# Patient Record
Sex: MALE | Race: BLACK OR AFRICAN AMERICAN | NOT HISPANIC OR LATINO | Employment: UNEMPLOYED | URBAN - METROPOLITAN AREA
[De-identification: names, ages, dates, MRNs, and addresses within clinical notes are randomized per-mention and may not be internally consistent; named-entity substitution may affect disease eponyms.]

---

## 2018-07-03 ENCOUNTER — HOSPITAL ENCOUNTER (EMERGENCY)
Facility: CLINIC | Age: 23
Discharge: HOME | End: 2018-07-03
Attending: EMERGENCY MEDICINE | Admitting: EMERGENCY MEDICINE
Payer: COMMERCIAL

## 2018-07-03 VITALS
WEIGHT: 200 LBS | TEMPERATURE: 98.3 F | SYSTOLIC BLOOD PRESSURE: 133 MMHG | DIASTOLIC BLOOD PRESSURE: 81 MMHG | RESPIRATION RATE: 18 BRPM | OXYGEN SATURATION: 99 %

## 2018-07-03 DIAGNOSIS — S43.003A: ICD-10-CM

## 2018-07-03 DIAGNOSIS — E10.649 HYPOGLYCEMIA DUE TO TYPE 1 DIABETES MELLITUS (H): ICD-10-CM

## 2018-07-03 DIAGNOSIS — R56.9 GENERALIZED CONVULSIVE SEIZURE (H): ICD-10-CM

## 2018-07-03 LAB
GLUCOSE BLDC GLUCOMTR-MCNC: 116 MG/DL (ref 70–99)
GLUCOSE BLDC GLUCOMTR-MCNC: 130 MG/DL (ref 70–99)
GLUCOSE BLDC GLUCOMTR-MCNC: 139 MG/DL (ref 70–99)
GLUCOSE BLDC GLUCOMTR-MCNC: 70 MG/DL (ref 70–99)

## 2018-07-03 PROCEDURE — 25000132 ZZH RX MED GY IP 250 OP 250 PS 637: Performed by: EMERGENCY MEDICINE

## 2018-07-03 PROCEDURE — 96361 HYDRATE IV INFUSION ADD-ON: CPT

## 2018-07-03 PROCEDURE — 96374 THER/PROPH/DIAG INJ IV PUSH: CPT

## 2018-07-03 PROCEDURE — 25000128 H RX IP 250 OP 636: Performed by: EMERGENCY MEDICINE

## 2018-07-03 PROCEDURE — 99284 EMERGENCY DEPT VISIT MOD MDM: CPT | Mod: 25

## 2018-07-03 PROCEDURE — 00000146 ZZHCL STATISTIC GLUCOSE BY METER IP

## 2018-07-03 PROCEDURE — 80177 DRUG SCRN QUAN LEVETIRACETAM: CPT | Performed by: EMERGENCY MEDICINE

## 2018-07-03 RX ORDER — KETOROLAC TROMETHAMINE 15 MG/ML
15 INJECTION, SOLUTION INTRAMUSCULAR; INTRAVENOUS ONCE
Status: COMPLETED | OUTPATIENT
Start: 2018-07-03 | End: 2018-07-03

## 2018-07-03 RX ORDER — LEVETIRACETAM 500 MG/1
500 TABLET ORAL ONCE
Status: COMPLETED | OUTPATIENT
Start: 2018-07-03 | End: 2018-07-03

## 2018-07-03 RX ADMIN — SODIUM CHLORIDE, POTASSIUM CHLORIDE, SODIUM LACTATE AND CALCIUM CHLORIDE 500 ML: 600; 310; 30; 20 INJECTION, SOLUTION INTRAVENOUS at 16:58

## 2018-07-03 RX ADMIN — LEVETIRACETAM 500 MG: 500 TABLET, FILM COATED ORAL at 18:16

## 2018-07-03 RX ADMIN — KETOROLAC TROMETHAMINE 15 MG: 15 INJECTION, SOLUTION INTRAMUSCULAR; INTRAVENOUS at 16:58

## 2018-07-03 ASSESSMENT — ENCOUNTER SYMPTOMS: SEIZURES: 1

## 2018-07-03 NOTE — ED AVS SNAPSHOT
Emergency Department    [address] [place] [address]    Phone:  [phone]    Fax:  [phone]                                       Dante Ivey   MRN: [id number]    Department:   Emergency Department   Date of Visit:  7/3/2018           Patient Information     Date Of Birth          1995        Your diagnoses for this visit were:     Generalized convulsive seizure (H) Likely secondary to hypoglycemia    Hypoglycemia due to type 1 diabetes mellitus (H)     Shoulder subluxation, unspecified laterality, initial encounter Bilat during the seizure       You were seen by Judy Huber MD.      Follow-up Information     Schedule an appointment as soon as possible for a visit with No Ref-Primary, Physician.        Discharge Instructions       Eat a big snack before you go to bed tonight and wake yourself up every 2-3 hours to check your blood sugar.  Adjust your insulin as appropriate and go back to your normal management plan and diet tomorrow.  You cannot drive until you are cleared by your neurologist.  Return to the ER if you have further seizures.  Make sure you carry a form of sugar with you at all times and avoid hypoglycemia.  Tylenol as needed or ibuprofen for your shoulder pain.  Recheck in the clinic with your doctor or an orthopedic doctor if your shoulders continue to bother you over the next week or 2.  Continue your Keppra and take your normal dose tonight.        Discharge References/Attachments     BLOOD SUGAR, LOW; HYPOGLYCEMIA (ENGLISH)      24 Hour Appointment Hotline       To make an appointment at any [facility], call [phone] ([phone]). If you don't have a family doctor or clinic, we will help you find one. [facility] are conveniently located to serve the needs of you and your family.             Review of your medicines      Notice     You have not been prescribed any medications.            Procedures and tests performed during your  visit     Procedure/Test Number of Times Performed    Glucose by meter 4    Keppra (Levetiracetam) Level 1      Orders Needing Specimen Collection     None      Pending Results     Date and Time Order Name Status Description    7/3/2018 1707 Keppra (Levetiracetam) Level In process             Pending Culture Results     No orders found from 7/1/2018 to 7/4/2018.            Pending Results Instructions     If you had any lab results that were not finalized at the time of your Discharge, you can call the ED Lab Result RN at [phone]. You will be contacted by this team for any positive Lab results or changes in treatment. The nurses are available 7 days a week from 10A to 6:30P.  You can leave a message 24 hours per day and they will return your call.        Test Results From Your Hospital Stay        7/3/2018  5:05 PM      Component Results     Component Value Ref Range & Units Status    Glucose 116 (H) 70 - 99 mg/dL Final         7/3/2018  5:05 PM      Component Results     Component Value Ref Range & Units Status    Glucose 130 (H) 70 - 99 mg/dL Final         7/3/2018  6:05 PM         7/3/2018  6:04 PM      Component Results     Component Value Ref Range & Units Status    Glucose 70 70 - 99 mg/dL Final         7/3/2018  7:26 PM      Component Results     Component Value Ref Range & Units Status    Glucose 139 (H) 70 - 99 mg/dL Final                Clinical Quality Measure: Blood Pressure Screening     Your blood pressure was checked while you were in the emergency department today. The last reading we obtained was  BP: 124/82 . Please read the guidelines below about what these numbers mean and what you should do about them.  If your systolic blood pressure (the top number) is less than 120 and your diastolic blood pressure (the bottom number) is less than 80, then your blood pressure is normal. There is nothing more that you need to do about it.  If your systolic blood pressure (the top number) is 120-139 or  "your diastolic blood pressure (the bottom number) is 80-89, your blood pressure may be higher than it should be. You should have your blood pressure rechecked within a year by a primary care provider.  If your systolic blood pressure (the top number) is 140 or greater or your diastolic blood pressure (the bottom number) is 90 or greater, you may have high blood pressure. High blood pressure is treatable, but if left untreated over time it can put you at risk for heart attack, stroke, or kidney failure. You should have your blood pressure rechecked by a primary care provider within the next 4 weeks.  If your provider in the emergency department today gave you specific instructions to follow-up with your doctor or provider even sooner than that, you should follow that instruction and not wait for up to 4 weeks for your follow-up visit.        Thank you for choosing [facility]       Thank you for choosing [facility] for your care. Our goal is always to provide you with excellent care. Hearing back from our patients is one way we can continue to improve our services. Please take a few minutes to complete the written survey that you may receive in the mail after you visit with us. Thank you!        [employer] Information     [employer] lets you send messages to your doctor, view your test results, renew your prescriptions, schedule appointments and more. To sign up, go to [email or web address] . Click on \"Log in\" on the left side of the screen, which will take you to the Welcome page. Then click on \"Sign up Now\" on the right side of the page.     You will be asked to enter the access code listed below, as well as some personal information. Please follow the directions to create your username and password.     Your access code is: [email or web address]  Expires: 10/1/2018  8:07 PM     Your access code will  in 90 days. If you need help or a new code, please call your [facility] clinic or [phone].        Care EveryWhere ID     " This is your Care EveryWhere ID. This could be used by other organizations to access your [place] medical records  [id number]        Equal Access to Services     SHELBIE DEL CID : Michael Galo, hannah collins, lara winn, tariq velez. So [facility] [phone].    ATENCIÓN: Si habla español, tiene a conn disposición servicios gratuitos de asistencia lingüística. Llame al [phone].    We comply with applicable federal civil rights laws and [place] laws. We do not discriminate on the basis of race, color, national origin, age, disability, sex, sexual orientation, or gender identity.            After Visit Summary       This is your record. Keep this with you and show to your community pharmacist(s) and doctor(s) at your next visit.

## 2018-07-03 NOTE — ED PROVIDER NOTES
"  History   Chief Complaint:  Hypoglycemia     HPI   Dante Ivey is a 23 year old male with a history of diabetes who presents via EMS with hypoglycemia. EMS reports that the patient took insulin before eating around 1330 when he began feeling poorly. The patient notes he took too much, and actually had a seizure witnessed by his girlfriend prompting her to call EMS. EMS administered two to three amps of D-50, and states that he ate fries and a sandwich. The patient's girlfriend later adds that in reality the patient only ate a couple fries. The patient's sugar varied throughout the time with EMS, but leveled off in the 150's. His blood sugar at its lowest wsa 58 as measured by EMS. The patient notes he feels better in the ED, but is still \"out of it.\" He has no history of kidney disease, and had one other seizure in the past.    Allergies:  No known drug allergies    Medications:    Keppra   Insulin    Past Medical History:    Type I diabetes  Epilepsy    Past Surgical History:    History reviewed. No pertinent surgical history.    Family History:    History reviewed. No pertinent family history.     Social History:  Smoking status: Never smoker  Alcohol use: No  Marital Status:  Single    Review of Systems   Neurological: Positive for seizures.   All other systems reviewed and are negative.    Physical Exam   Patient Vitals for the past 24 hrs:   BP Temp Temp src Heart Rate Resp SpO2 Weight   07/03/18 1616 124/82 98.3  F (36.8  C) Oral 94 18 99 % 90.7 kg (200 lb)     Physical Exam  Nursing note and vitals reviewed.    Constitutional:  Appears well-developed and well-nourished, comfortable.    HENT:     Bite on the side of his tongue.      Oropharynx is clear and moist.  Eyes:    Conjunctivae are normal without injection. No lid droop.     Pupils are equal, round, and reactive to light.      Right eye exhibits no discharge. Left eye exhibits no discharge.      No scleral icterus.  Lymph:  No enlarged or tender " cervical or submandibular lymph nodes.   Cardiovascular:  Normal rate, regular rhythm with normal S1 and S2.      Normal heart sounds and peripheral pulses 2+ and equal.       No murmur or dalila.  Pulmonary:  Effort normal and breath sounds clear to auscultation bilaterally   No respiratory distress.  No stridor.     No wheezes. No rales.     GI:    Soft. No distension and no mass. No tenderness.      No rebound and no guarding. No flank pain.  No HSM.  Musculoskeletal:  Good CMS distally. Shoulders tender with movement with full range of motion.      No extremity deformity.     No edema.  No cyanosis.                                      Neck supple, no midline spinal tenderness.   Neurological:   Alert and oriented. No cranial nerve deficit, no facial droop.     Exhibits good muscle tone. Coordination normal.     GCS eye subscore is 4. GCS verbal subscore is 5.      GCS motor subscore is 6.   Skin:    Skin is warm and dry. No rash noted. No diaphoresis.      No erythema. No pallor.  No lesions.  Psychiatric:   Behavior is normal. Appropriate mood and affect.     Judgment and thought content normal.     Emergency Department Course   Laboratory:  Keppra Level: Pending    Collected 1614: Glucose: 116 (H)  Collected 1651: Glucose: 130 (H)  Collected 1751: Glucose: 70  Collected 1914: Glucose: 139 (H)    Interventions:  1658:  mL's IV Bolus  1658: Toradol 15 mg IV  1816: Keppra 500 mg PO    Emergency Department Course:  Past medical records, nursing notes, and vitals reviewed.  1613: I performed an exam of the patient and obtained history, as documented above.  IV inserted and blood drawn.    1700: I rechecked the patient. Explained findings to patient.    1950: I rechecked the patient.    Findings and plan explained to the patient. Patient discharged home with instructions regarding supportive care, medications, and reasons to return. The importance of close follow-up was reviewed.     Impression & Plan     Medical Decision Making:  Patient comes in after having low blood sugar and a subsequent tonic-clonic seizure.  This has happened in the past.  He does have underlying epilepsy but his most recent seizures have all been with low blood sugars.  Blood sugar was 50 at the scene.  He was given multiple ampules of D50 IV and ate a few French fries.  His blood sugar initially came up and then it went back down and now has come up to about 160 upon arrival.  Here he is awake and alert.  His shoulders are sore and his girlfriend said that it looks like his shoulders dislocated.  There are not dislocated here but he likely subluxed them from the force of the seizure.  He was given food to eat here and blood sugars were monitored.  They have remained normal other than one reading at 70.  He did bite his tongue and so it was definitely a seizure that occurred and I believe it was most likely secondary to the low blood sugar.  However I did tell him he needs to follow-up with his neurologist and he cannot drive until he is cleared by the neurologist.  I did give him an extra 500 mg of Keppra here and sent off a Keppra level for his neurologist to follow-up.  I do want him to follow-up with his doctor.    Diagnosis:    ICD-10-CM    1. Generalized convulsive seizure (H) R56.9     Likely secondary to hypoglycemia   2. Hypoglycemia due to type 1 diabetes mellitus (H) E10.649    3. Shoulder subluxation, unspecified laterality, initial encounter S43.003A     Bilat during the seizure       Disposition:  Discharged to home. Eat a big snack before you go to bed tonight and wake yourself up every 2-3 hours to check your blood sugar.  Adjust your insulin as appropriate and go back to your normal management plan and diet tomorrow.  You cannot drive until you are cleared by your neurologist.  Return to the ER if you have further seizures.  Make sure you carry a form of sugar with you at all times and avoid hypoglycemia.  Tylenol as needed  or ibuprofen for your shoulder pain.  Recheck in the clinic with your doctor or an orthopedic doctor if your shoulders continue to bother you over the next week or 2.  Continue your Keppra and take your normal dose tonight.    El Moon  7/3/2018    EMERGENCY DEPARTMENT  I, El Moon, am serving as a scribe at 4:13 PM on 7/3/2018 to document services personally performed by Judy Huber MD based on my observations and the provider's statements to me.        Judy Huber MD  07/03/18 2038

## 2018-07-03 NOTE — ED NOTES
Bed: ED05  Expected date:   Expected time:   Means of arrival:   Comments:  511  23 m accidental insulin OD  1555     Yuki Wallace RN  07/03/18 6419

## 2018-07-03 NOTE — ED AVS SNAPSHOT
Emergency Department    [address] [place] [address]    Phone:  [phone]    Fax:  [phone]                                       Dante Ivey   MRN: [id number]    Department:   Emergency Department   Date of Visit:  7/3/2018           After Visit Summary Signature Page     I have received my discharge instructions, and my questions have been answered. I have discussed any challenges I see with this plan with the nurse or doctor.    ..........................................................................................................................................  Patient/Patient Representative Signature      ..........................................................................................................................................  Patient Representative Print Name and Relationship to Patient    ..................................................               ................................................  Date                                            Time    ..........................................................................................................................................  Reviewed by Signature/Title    ...................................................              ..............................................  Date                                                            Time

## 2018-07-03 NOTE — ED TRIAGE NOTES
EMS report: pt was at the [facility] and took his fast acting insulin and was about to eat and had a witness seizure. EMS reports first BG at 53, after amp of D50 BG up to 160s than with in 15 min down to the 60s than another amp of D50 was given. On arrive

## 2018-07-04 LAB — LEVETIRACETAM SERPL-MCNC: 15 UG/ML (ref 12–46)

## 2018-07-04 NOTE — ED NOTES
[facility]  ED Nurse Handoff Report    ED Chief complaint: Hypoglycemia      ED Diagnosis:   Final diagnoses:   None       Code Status: Full Code    Allergies: No Known Allergies    Activity level - Baseline/Home:  Independent    Activity Level - Current:   Independent     Needed?: No    Isolation: No  Infection: Not Applicable  Bariatric?: No    Vital Signs:   Vitals:    07/03/18 1616   BP: 124/82   Resp: 18   Temp: 98.3  F (36.8  C)   TempSrc: Oral   SpO2: 99%   Weight: 90.7 kg (200 lb)       Cardiac Rhythm: ,        Pain level: 0-10 Pain Scale: 7    Is this patient confused?: No   [place] - Suicide Severity Rating Scale Completed?  Yes  If yes, what color did the patient score?  White    Patient Report: Initial Complaint: low blood sugar with seizure   Focused Assessment: pt arrived EMS report: pt was at the [facility] and took his fast acting insulin and was about to eat and had a witness seizure. EMS reports first BG at 53, after amp of D50 BG up to 160s than with in 15 min down to the 60s than another amp of D50 was given. On arrive  on recheck it was 130; after an hour pt reports felling low again and BG 70, a meal was given to pt.   Tests Performed: keppra level and BG  Abnormal Results: see results  Treatments provided: fluids and Keppra    Family Comments: parents and friends at bedside     OBS brochure/video discussed/provided to patient: N/A    ED Medications:   Medications   lactated ringers BOLUS 500 mL (500 mLs Intravenous New Bag 7/3/18 1658)   ketorolac (TORADOL) injection 15 mg (15 mg Intravenous Given 7/3/18 1658)   levETIRAcetam (KEPPRA) tablet 500 mg (500 mg Oral Given 7/3/18 1816)       Drips infusing?:  No    For the majority of the shift this patient was Green.   Interventions performed were .    Severe Sepsis OR Septic Shock Diagnosis Present: No      ED NURSE PHONE NUMBER: *[phone]

## 2018-07-04 NOTE — DISCHARGE INSTRUCTIONS
Eat a big snack before you go to bed tonight and wake yourself up every 2-3 hours to check your blood sugar.  Adjust your insulin as appropriate and go back to your normal management plan and diet tomorrow.  You cannot drive until you are cleared by your neurologist.  Return to the ER if you have further seizures.  Make sure you carry a form of sugar with you at all times and avoid hypoglycemia.  Tylenol as needed or ibuprofen for your shoulder pain.  Recheck in the clinic with your doctor or an orthopedic doctor if your shoulders continue to bother you over the next week or 2.  Continue your Keppra and take your normal dose tonight.

## 2024-07-12 ENCOUNTER — HOSPITAL ENCOUNTER (EMERGENCY)
Facility: CLINIC | Age: 29
Discharge: HOME | End: 2024-07-13
Attending: EMERGENCY MEDICINE | Admitting: EMERGENCY MEDICINE
Payer: COMMERCIAL

## 2024-07-12 DIAGNOSIS — R56.9 SEIZURE (H): Primary | ICD-10-CM

## 2024-07-12 DIAGNOSIS — E16.2 HYPOGLYCEMIA: ICD-10-CM

## 2024-07-12 LAB
ANION GAP SERPL CALCULATED.3IONS-SCNC: 12 MMOL/L (ref 7–15)
BASOPHILS # BLD AUTO: 0 10E3/UL (ref 0–0.2)
BASOPHILS NFR BLD AUTO: 0 %
BUN SERPL-MCNC: 8.9 MG/DL (ref 6–20)
CALCIUM SERPL-MCNC: 8.9 MG/DL (ref 8.6–10)
CHLORIDE SERPL-SCNC: 103 MMOL/L (ref 98–107)
CREAT SERPL-MCNC: 0.81 MG/DL (ref 0.67–1.17)
DEPRECATED HCO3 PLAS-SCNC: 24 MMOL/L (ref 22–29)
EGFRCR SERPLBLD CKD-EPI 2021: >90 ML/MIN/1.73M2
EOSINOPHIL # BLD AUTO: 0.2 10E3/UL (ref 0–0.7)
EOSINOPHIL NFR BLD AUTO: 3 %
ERYTHROCYTE [DISTWIDTH] IN BLOOD BY AUTOMATED COUNT: 12.3 % (ref 10–15)
GLUCOSE BLDC GLUCOMTR-MCNC: 109 MG/DL (ref 70–99)
GLUCOSE BLDC GLUCOMTR-MCNC: 116 MG/DL (ref 70–99)
GLUCOSE BLDC GLUCOMTR-MCNC: 148 MG/DL (ref 70–99)
GLUCOSE BLDC GLUCOMTR-MCNC: 38 MG/DL (ref 70–99)
GLUCOSE SERPL-MCNC: 120 MG/DL (ref 70–99)
HCT VFR BLD AUTO: 41.8 % (ref 40–53)
HGB BLD-MCNC: 13.9 G/DL (ref 13.3–17.7)
IMM GRANULOCYTES # BLD: 0 10E3/UL
IMM GRANULOCYTES NFR BLD: 0 %
LYMPHOCYTES # BLD AUTO: 1.1 10E3/UL (ref 0.8–5.3)
LYMPHOCYTES NFR BLD AUTO: 18 %
MCH RBC QN AUTO: 29.3 PG (ref 26.5–33)
MCHC RBC AUTO-ENTMCNC: 33.3 G/DL (ref 31.5–36.5)
MCV RBC AUTO: 88 FL (ref 78–100)
MONOCYTES # BLD AUTO: 0.6 10E3/UL (ref 0–1.3)
MONOCYTES NFR BLD AUTO: 10 %
NEUTROPHILS # BLD AUTO: 4.1 10E3/UL (ref 1.6–8.3)
NEUTROPHILS NFR BLD AUTO: 68 %
NRBC # BLD AUTO: 0 10E3/UL
NRBC BLD AUTO-RTO: 0 /100
PLATELET # BLD AUTO: 253 10E3/UL (ref 150–450)
POTASSIUM SERPL-SCNC: 4 MMOL/L (ref 3.4–5.3)
RBC # BLD AUTO: 4.75 10E6/UL (ref 4.4–5.9)
SODIUM SERPL-SCNC: 139 MMOL/L (ref 135–145)
WBC # BLD AUTO: 6 10E3/UL (ref 4–11)

## 2024-07-12 PROCEDURE — 80048 BASIC METABOLIC PNL TOTAL CA: CPT | Performed by: EMERGENCY MEDICINE

## 2024-07-12 PROCEDURE — 258N000001 HC RX 258: Performed by: EMERGENCY MEDICINE

## 2024-07-12 PROCEDURE — 96374 THER/PROPH/DIAG INJ IV PUSH: CPT

## 2024-07-12 PROCEDURE — 85025 COMPLETE CBC W/AUTO DIFF WBC: CPT | Performed by: EMERGENCY MEDICINE

## 2024-07-12 PROCEDURE — 36415 COLL VENOUS BLD VENIPUNCTURE: CPT | Performed by: EMERGENCY MEDICINE

## 2024-07-12 PROCEDURE — 87637 SARSCOV2&INF A&B&RSV AMP PRB: CPT | Performed by: EMERGENCY MEDICINE

## 2024-07-12 PROCEDURE — 82962 GLUCOSE BLOOD TEST: CPT

## 2024-07-12 PROCEDURE — 250N000013 HC RX MED GY IP 250 OP 250 PS 637: Performed by: EMERGENCY MEDICINE

## 2024-07-12 PROCEDURE — 80177 DRUG SCRN QUAN LEVETIRACETAM: CPT | Performed by: EMERGENCY MEDICINE

## 2024-07-12 PROCEDURE — 99284 EMERGENCY DEPT VISIT MOD MDM: CPT | Mod: 25

## 2024-07-12 RX ORDER — DEXTROSE MONOHYDRATE 25 G/50ML
50 INJECTION, SOLUTION INTRAVENOUS ONCE
Status: COMPLETED | OUTPATIENT
Start: 2024-07-12 | End: 2024-07-12

## 2024-07-12 RX ORDER — LEVETIRACETAM 500 MG/1
500 TABLET ORAL ONCE
Status: COMPLETED | OUTPATIENT
Start: 2024-07-12 | End: 2024-07-12

## 2024-07-12 RX ADMIN — LEVETIRACETAM 500 MG: 500 TABLET, FILM COATED ORAL at 23:12

## 2024-07-12 RX ADMIN — DEXTROSE MONOHYDRATE 50 ML: 25 INJECTION, SOLUTION INTRAVENOUS at 21:29

## 2024-07-12 ASSESSMENT — ACTIVITIES OF DAILY LIVING (ADL)
ADLS_ACUITY_SCORE: 35
ADLS_ACUITY_SCORE: 35

## 2024-07-12 ASSESSMENT — COLUMBIA-SUICIDE SEVERITY RATING SCALE - C-SSRS
1. IN THE PAST MONTH, HAVE YOU WISHED YOU WERE DEAD OR WISHED YOU COULD GO TO SLEEP AND NOT WAKE UP?: NO
6. HAVE YOU EVER DONE ANYTHING, STARTED TO DO ANYTHING, OR PREPARED TO DO ANYTHING TO END YOUR LIFE?: NO
2. HAVE YOU ACTUALLY HAD ANY THOUGHTS OF KILLING YOURSELF IN THE PAST MONTH?: NO

## 2024-07-13 ENCOUNTER — TELEPHONE (OUTPATIENT)
Dept: NURSING | Facility: CLINIC | Age: 29
End: 2024-07-13
Payer: COMMERCIAL

## 2024-07-13 VITALS
OXYGEN SATURATION: 96 % | WEIGHT: 190 LBS | HEART RATE: 80 BPM | RESPIRATION RATE: 17 BRPM | SYSTOLIC BLOOD PRESSURE: 113 MMHG | HEIGHT: 69 IN | BODY MASS INDEX: 28.14 KG/M2 | TEMPERATURE: 97.7 F | DIASTOLIC BLOOD PRESSURE: 65 MMHG

## 2024-07-13 LAB
FLUAV RNA SPEC QL NAA+PROBE: NEGATIVE
FLUBV RNA RESP QL NAA+PROBE: NEGATIVE
GLUCOSE BLDC GLUCOMTR-MCNC: 159 MG/DL (ref 70–99)
LEVETIRACETAM SERPL-MCNC: 5.5 ΜG/ML (ref 10–40)
RSV RNA SPEC NAA+PROBE: NEGATIVE
SARS-COV-2 RNA RESP QL NAA+PROBE: NEGATIVE

## 2024-07-13 PROCEDURE — 82962 GLUCOSE BLOOD TEST: CPT

## 2024-07-13 NOTE — TELEPHONE ENCOUNTER
[facility]    Reason for call: Lab Result Notification     Lab Result (including Rx patient on, if applicable).  If culture, copy of lab report at bottom.  Lab Result: Keppra  Component      Latest Ref Rng 7/12/2024  11:12 PM   Keppra (Levetiracetam) Level      10.0 - 40.0  g/mL 5.5 (L)     Legend:  (L) Low  Creatinine Level (mg/dl)   Creatinine   Date Value Ref Range Status   07/12/2024 0.81 0.67 - 1.17 mg/dL Final    Creatinine clearance (ml/min), if applicable    Serum creatinine: 0.81 mg/dL 07/12/24 2154  Estimated creatinine clearance: 146.4 mL/min     RN Recommendations/Instructions per [facility] ED lab result protocol:   [facility] ED lab result protocol utilized: Miscellaneous Labs    Unable to reach patient/caregiver.   Left voicemail message requesting a call back to [phone] between 9 a.m. and 5:30 p.m. for patient's ED/UC lab results.  Letter pended to be sent via [employer] mail.     Cheri Luevano RN

## 2024-07-13 NOTE — ED NOTES
Pt with BG of 38.  Dextrose given.  Pt A&OX4, wife still at bedside.  Wife reported BG of 68 on pt's monitor.

## 2024-07-13 NOTE — LETTER
July 13, 2024        Dante Ivey  [address]  [place] [address]          Dear Dante Ivey:    You were seen in the [facility] Emergency Department at [facility] on 7/12/2024.  We are unable to reach you by phone, so we are sending you this letter.     It is important that you call [facility] Emergency Department lab result nurse at [phone], as we have information to relay to you AND/OR we MAY have to make some changes in your treatment.    Best time to call back is between 9AM and 5:30PM, 7 days a week.      Sincerely,     [facility] Emergency Department Lab Result RN  [phone]

## 2024-07-13 NOTE — ED NOTES
Bed: ED01  Expected date:   Expected time:   Means of arrival:   Comments:  [place] 89 - 29M Seizure, Hypoglycemia. Alert and oriented

## 2024-07-13 NOTE — ED TRIAGE NOTES
Pt BIBA due to witness seizure lasting 30 to 60 seconds at home.  Pt with hx of epilepsy and DM 1.  Wife states pt did not fall during seizure.  Pt states this has happened before with low BG.  EMS BG 64.  EMS gave D10 250 and BG recheck was 195.  Pt also given Toradol 15 by EMS for bilateral shoulder pain, which pt states he gets postictal, and Zofran 4mg IV for nausea.  Pt has insulin pump.  Pt A&OX4 at this time.  Wife at bedside.   Triage Assessment (Adult)       Row Name 07/12/24 2110          Triage Assessment    Airway WDL WDL        Respiratory WDL    Respiratory WDL WDL        Skin Circulation/Temperature WDL    Skin Circulation/Temperature WDL WDL        Cardiac WDL    Cardiac WDL WDL        Cognitive/Neuro/Behavioral WDL    Cognitive/Neuro/Behavioral WDL WDL

## 2024-07-13 NOTE — ED PROVIDER NOTES
Emergency Department Note      History of Present Illness     Chief Complaint   Seizures    HPI   Dante Ivey is a 29 year old male with history of type I diabetes and epilepsy who presents with his family via EMS for evaluation of a seizure. The patient reports that he at 1927 was walking to get tacos for dinner and calculated his insulin pump to administer 11.2 unit(s) of short acting for 6 carbohydrates he was planning to have with dinner. His sugar was 124 at that time. States that he ate, and shortly after getting home, he began to feel disoriented and then remembers waking up with EMS on the gurney. His significant other reports that he had a 30 second generalized tonic clonic seizure and was post ictal for about 2 minutes. Adds that the patient got a blood sugar new sensor/insulin pump about 3 weeks ago and has also had increased stressors recently. States that they did not get any message about a sensor error or malfunction. Reports that the patient has had hypoglycemic seizures in the past, the most recent of which was in 2018. Notes that he did take his prescribed 500 mg Keppra at 0800 today and but has not taken his night time dose. He normally takes 500mg Keppra twice daily. The patient endorses slight nausea and headache since his seizure. He denies fever, chest pain, shortness of breath, vomiting, diarrhea, abdominal pain, and urinary incontinence.     Independent Historian   Wife as detailed above.    Review of External Notes   I reviewed urgent care note from 3/27/24.    Past Medical History     Medical History and Problem List   Type I diabetes  Epilepsy    Medications   Novolog  Glucagon   Levetriacetam     Physical Exam     Patient Vitals for the past 24 hrs:   BP Temp Temp src Pulse Resp SpO2 Height Weight   07/13/24 0030 113/65 -- -- 80 17 -- -- --   07/13/24 0000 -- -- -- -- -- 96 % -- --   07/12/24 2330 119/79 -- -- 85 18 99 % -- --   07/12/24 2216 124/68 -- -- -- -- -- -- --  "  07/12/24 2200 -- -- -- 93 20 98 % -- --   07/12/24 2109 118/71 -- -- 92 -- 98 % -- --   07/12/24 2107 117/71 97.7  F (36.5  C) Oral 75 16 94 % 1.753 m (5' 9\") 86.2 kg (190 lb)     Physical Exam  Constitutional:       General: Not in acute distress.     Appearance: Normal appearance  HENT:      Head: Normocephalic and atraumatic.      Mouth: Mild right-sided lateral tongue abrasion.  Eyes:     Extraocular Movements: Extraocular movements intact.      Conjunctiva/sclera: Conjunctivae normal.      Pupils: Pupils are equal, round, and reactive to light.   Cardiovascular:     Rate and Rhythm: Normal rate and regular rhythm.   Pulmonary:      Effort: Pulmonary effort is normal.      Breath sounds: Normal breath sounds.   Abdominal:      General: Abdomen is flat. There is no distension.      Palpations: Abdomen is soft.      Tenderness: There is no abdominal tenderness.   Musculoskeletal:      Cervical back: Normal range of motion and neck supple. No rigidity.      General: No swelling or deformity.   Skin:     General: Skin is warm and dry.   Neurological:      General: No focal deficit present.     Mental Status: Alert and oriented to person, place, and time.   Psychiatric:         Mood and Affect: Mood normal.         Behavior: Behavior normal.     Diagnostics     Lab Results   Labs Ordered and Resulted from Time of ED Arrival to Time of ED Departure   GLUCOSE BY METER - Abnormal       Result Value    GLUCOSE BY METER POCT 38 (*)    BASIC METABOLIC PANEL - Abnormal    Sodium 139      Potassium 4.0      Chloride 103      Carbon Dioxide (CO2) 24      Anion Gap 12      Urea Nitrogen 8.9      Creatinine 0.81      GFR Estimate >90      Calcium 8.9      Glucose 120 (*)    GLUCOSE BY METER - Abnormal    GLUCOSE BY METER POCT 148 (*)    GLUCOSE BY METER - Abnormal    GLUCOSE BY METER POCT 116 (*)    GLUCOSE BY METER - Abnormal    GLUCOSE BY METER POCT 109 (*)    GLUCOSE BY METER - Abnormal    GLUCOSE BY METER POCT 159 (*)  "   INFLUENZA A/B, RSV, & SARS-COV2 PCR - Normal    Influenza A PCR Negative      Influenza B PCR Negative      RSV PCR Negative      SARS CoV2 PCR Negative     CBC WITH PLATELETS AND DIFFERENTIAL    WBC Count 6.0      RBC Count 4.75      Hemoglobin 13.9      Hematocrit 41.8      MCV 88      MCH 29.3      MCHC 33.3      RDW 12.3      Platelet Count 253      % Neutrophils 68      % Lymphocytes 18      % Monocytes 10      % Eosinophils 3      % Basophils 0      % Immature Granulocytes 0      NRBCs per 100 WBC 0      Absolute Neutrophils 4.1      Absolute Lymphocytes 1.1      Absolute Monocytes 0.6      Absolute Eosinophils 0.2      Absolute Basophils 0.0      Absolute Immature Granulocytes 0.0      Absolute NRBCs 0.0         Imaging   No orders to display     Independent Interpretation   None    ED Course      Medications Administered   Medications   dextrose 50 % injection 50 mL (50 mLs Intravenous $Given 7/12/24 2126)   levETIRAcetam (KEPPRA) tablet 500 mg (500 mg Oral $Given 7/12/24 2312)       Procedures   Procedures     Discussion of Management   None    ED Course   ED Course as of 07/13/24 0522   Fri Jul 12, 2024   2247 I obtained history and examined the patient as noted above.    Sat Jul 13, 2024   0031 Patient resting in bed in no acute distress.  Updated on lab findings.  Steadily climbing blood sugar levels.  No further seizure episodes or hypoglycemic episodes in the ER.  Patient feels comfortable with discharge home at this time to follow-up outpatient with endocrinology and neurology.  Advised for patient to continue to closely monitor blood sugar levels at home.  Discussed return precautions.  Answered all questions.  Patient and family feels comfortable with discharge at this time.       Optional/Additional Documentation  None    Medical Decision Making / Diagnosis     CMS Diagnoses: None    MIPS       None    [facility]   Dante Ivey is a 29-year-old male as described above presents to the emergency  department for seizure episode.  Patient hemodynamically stable at time of evaluation.  Afebrile.  Episode lasted for 30 seconds with only short 1 to 2-minute of ictal phase.  Patient does have some mild lateral tongue abrasions.  Per patient and family, symptoms are pretty consistent with prior seizure episodes.  At time of my evaluation, patient is alert and oriented.  No complaints at this time.  Recently does have his insulin pump upgraded 3 weeks ago.  Patient denies having any symptoms recently since the pump change and is otherwise fairly compliant with his insulin delivery protocol.  Per EMS, patient had initial blood sugar 64 and upon arrival to the ER, patient's blood sugar lowered further into the 30s.  Suspect possibility of seizure episode secondary to hypoglycemia which patient reports has happened before, which could be secondary to new pump change versus inaccurate carb count.  Nonetheless, patient may also have became hypoglycemic due to consumption from seizure episode.  Wife does note that patient has been more stressed recently, always a possibility of breakthrough seizure.  Patient normally takes Keppra 5 mg p.o. twice daily.  Will obtain Keppra level.  Patient has not taken his nighttime dose of Keppra, will administer in the ER.  Will monitor in the ER for further hypoglycemic episodes/seizure.  If no further episodes, patient and family comfortable with following outpatient with primary endocrinologist and neurologist.  Discussed care plan with patient and family who voiced understanding and agreement with plan.  Answered all questions.  Additional workup and orders as listed in chart.     Please refer to ED course above as part of continuation of MDM for details on the patient's treatment course and any changes or updates in care plan beyond my initial evaluation and MDM creation.    Disposition   The patient was discharged.     Diagnosis     ICD-10-CM    1. Seizure (H)  R56.9       2.  Hypoglycemia  E16.2          Scribe Disclosure:  I, Zari Armstrong, am serving as a scribe at 11:53 PM on 7/12/2024 to document services personally performed by Jv Jiménez DO based on my observations and the provider's statements to me.        Jv Jiménez DO  07/13/24 0523

## 2024-07-13 NOTE — TELEPHONE ENCOUNTER
[facility]    Reason for call: Lab Result Notification     Lab Result (including Rx patient on, if applicable).  If culture, copy of lab report at bottom.  Lab Result:     Component      Latest Ref Rng 7/12/2024  11:12 PM   Keppra (Levetiracetam) Level      10.0 - 40.0  g/mL 5.5 (L)       Legend:  (L) Low    RN Recommendations/Instructions per [facility] ED lab result protocol:   [facility] ED lab result protocol utilized: [facility]  Encouraged to relay result to his neurologist  He states she may have missed a dose of the Keppra    Faraz Cordero RN

## 2024-08-04 ENCOUNTER — HEALTH MAINTENANCE LETTER (OUTPATIENT)
Age: 29
End: 2024-08-04

## 2025-01-31 ENCOUNTER — HOSPITAL ENCOUNTER (EMERGENCY)
Facility: CLINIC | Age: 30
Discharge: HOME | End: 2025-01-31
Attending: EMERGENCY MEDICINE | Admitting: EMERGENCY MEDICINE
Payer: COMMERCIAL

## 2025-01-31 VITALS
DIASTOLIC BLOOD PRESSURE: 70 MMHG | SYSTOLIC BLOOD PRESSURE: 119 MMHG | OXYGEN SATURATION: 98 % | RESPIRATION RATE: 16 BRPM | HEART RATE: 75 BPM | TEMPERATURE: 97.9 F

## 2025-01-31 DIAGNOSIS — E16.2 HYPOGLYCEMIA: ICD-10-CM

## 2025-01-31 DIAGNOSIS — E10.649 TYPE 1 DIABETES MELLITUS WITH HYPOGLYCEMIA AND WITHOUT COMA (H): ICD-10-CM

## 2025-01-31 LAB
ALBUMIN SERPL BCG-MCNC: 4.4 G/DL (ref 3.5–5.2)
ALP SERPL-CCNC: 86 U/L (ref 40–150)
ALT SERPL W P-5'-P-CCNC: 26 U/L (ref 0–70)
ANION GAP SERPL CALCULATED.3IONS-SCNC: 9 MMOL/L (ref 7–15)
AST SERPL W P-5'-P-CCNC: 45 U/L (ref 0–45)
BASOPHILS # BLD AUTO: 0 10E3/UL (ref 0–0.2)
BASOPHILS NFR BLD AUTO: 0 %
BILIRUB SERPL-MCNC: 0.3 MG/DL
BUN SERPL-MCNC: 10.8 MG/DL (ref 6–20)
CALCIUM SERPL-MCNC: 9.2 MG/DL (ref 8.8–10.4)
CHLORIDE SERPL-SCNC: 102 MMOL/L (ref 98–107)
CREAT SERPL-MCNC: 0.77 MG/DL (ref 0.67–1.17)
EGFRCR SERPLBLD CKD-EPI 2021: >90 ML/MIN/1.73M2
EOSINOPHIL # BLD AUTO: 0.3 10E3/UL (ref 0–0.7)
EOSINOPHIL NFR BLD AUTO: 3 %
ERYTHROCYTE [DISTWIDTH] IN BLOOD BY AUTOMATED COUNT: 12.1 % (ref 10–15)
GLUCOSE BLDC GLUCOMTR-MCNC: 125 MG/DL (ref 70–99)
GLUCOSE BLDC GLUCOMTR-MCNC: 177 MG/DL (ref 70–99)
GLUCOSE BLDC GLUCOMTR-MCNC: 273 MG/DL (ref 70–99)
GLUCOSE BLDC GLUCOMTR-MCNC: 284 MG/DL (ref 70–99)
GLUCOSE BLDC GLUCOMTR-MCNC: 79 MG/DL (ref 70–99)
GLUCOSE SERPL-MCNC: 101 MG/DL (ref 70–99)
HCO3 SERPL-SCNC: 29 MMOL/L (ref 22–29)
HCT VFR BLD AUTO: 43.4 % (ref 40–53)
HGB BLD-MCNC: 14.8 G/DL (ref 13.3–17.7)
HOLD SPECIMEN: NORMAL
HOLD SPECIMEN: NORMAL
IMM GRANULOCYTES # BLD: 0 10E3/UL
IMM GRANULOCYTES NFR BLD: 0 %
LYMPHOCYTES # BLD AUTO: 1.7 10E3/UL (ref 0.8–5.3)
LYMPHOCYTES NFR BLD AUTO: 19 %
MCH RBC QN AUTO: 29.6 PG (ref 26.5–33)
MCHC RBC AUTO-ENTMCNC: 34.1 G/DL (ref 31.5–36.5)
MCV RBC AUTO: 87 FL (ref 78–100)
MONOCYTES # BLD AUTO: 0.6 10E3/UL (ref 0–1.3)
MONOCYTES NFR BLD AUTO: 7 %
NEUTROPHILS # BLD AUTO: 6.3 10E3/UL (ref 1.6–8.3)
NEUTROPHILS NFR BLD AUTO: 70 %
NRBC # BLD AUTO: 0 10E3/UL
NRBC BLD AUTO-RTO: 0 /100
PLATELET # BLD AUTO: 269 10E3/UL (ref 150–450)
POTASSIUM SERPL-SCNC: 4.9 MMOL/L (ref 3.4–5.3)
PROT SERPL-MCNC: 7.5 G/DL (ref 6.4–8.3)
RBC # BLD AUTO: 5 10E6/UL (ref 4.4–5.9)
SODIUM SERPL-SCNC: 140 MMOL/L (ref 135–145)
WBC # BLD AUTO: 9 10E3/UL (ref 4–11)

## 2025-01-31 PROCEDURE — 99283 EMERGENCY DEPT VISIT LOW MDM: CPT

## 2025-01-31 PROCEDURE — 82962 GLUCOSE BLOOD TEST: CPT

## 2025-01-31 PROCEDURE — 36415 COLL VENOUS BLD VENIPUNCTURE: CPT | Performed by: EMERGENCY MEDICINE

## 2025-01-31 PROCEDURE — 84155 ASSAY OF PROTEIN SERUM: CPT | Performed by: EMERGENCY MEDICINE

## 2025-01-31 PROCEDURE — 85025 COMPLETE CBC W/AUTO DIFF WBC: CPT | Performed by: EMERGENCY MEDICINE

## 2025-01-31 PROCEDURE — 84460 ALANINE AMINO (ALT) (SGPT): CPT | Performed by: EMERGENCY MEDICINE

## 2025-01-31 ASSESSMENT — COLUMBIA-SUICIDE SEVERITY RATING SCALE - C-SSRS
1. IN THE PAST MONTH, HAVE YOU WISHED YOU WERE DEAD OR WISHED YOU COULD GO TO SLEEP AND NOT WAKE UP?: NO
2. HAVE YOU ACTUALLY HAD ANY THOUGHTS OF KILLING YOURSELF IN THE PAST MONTH?: NO
6. HAVE YOU EVER DONE ANYTHING, STARTED TO DO ANYTHING, OR PREPARED TO DO ANYTHING TO END YOUR LIFE?: NO

## 2025-01-31 ASSESSMENT — ACTIVITIES OF DAILY LIVING (ADL)
ADLS_ACUITY_SCORE: 41

## 2025-01-31 NOTE — DISCHARGE INSTRUCTIONS
As we discussed, it does look like your sugar is stabilized here, and I do think it is very important you talked about the your regular doctor or your endocrinologist as soon as you can to set up an appointment for early next week to be seen in person.  In the meantime, I would recommend using only 50% or possibly two thirds of the automatic pump that you have when it gives you recommendations for how much insulin to get as it does seem like you are having some rather wide swings in your blood sugar.  Please come back to the ER immediately with any other concerns you have or any new symptoms.

## 2025-01-31 NOTE — ED TRIAGE NOTES
Patient arrives with concerns for blood sugar variables. CGM woke them up and BG was 70, the lowest it got was 28. Administered glucagon and quite a bit of PO glucose.  Gave himself 5 units of short acting insulin PTA.      Triage Assessment (Adult)       Row Name 01/31/25 0139          Triage Assessment    Airway WDL WDL        Respiratory WDL    Respiratory WDL WDL        Skin Circulation/Temperature WDL    Skin Circulation/Temperature WDL WDL        Cardiac WDL    Cardiac WDL WDL        Peripheral/Neurovascular WDL    Peripheral Neurovascular WDL WDL        Cognitive/Neuro/Behavioral WDL    Cognitive/Neuro/Behavioral WDL WDL

## 2025-01-31 NOTE — ED PROVIDER NOTES
Emergency Department Note      History of Present Illness     Chief Complaint  Blood Sugar Problem    HPI  Dante Ivey is a 29 year old male with type 1 diabetes who presents to the emergency room with an episode of hypoglycemia while he was asleep.  He states that he took insulin as he typically does and is recommended to him by his continuous glucose monitor shortly before bed.  Wife got an alert on her phone noting that the blood sugar was dropping and she continue to watch it and noted that it continued to drop even while she was giving medication like glucose tabs, maple syrup etc.  When it got down to the 20s she gave glucagon IM, and called 911.  Patient did not have a seizure though has a history of seizures, and presents to the ER with sugar in the 300s, patient also notes that he gave an additional 5 units of insulin after all of this as a corrective that was recommended by his glucose pump.  Family notes that this is not really happened before and he had better control with no seizure episodes related to hypoglycemia in the past and that the pump is relatively new over the last year.  Patient denies any chest pain or abdominal pain, no infectious symptoms.      Independent Historian  Yes patient's wife is at the bedside and confirms the above history    Review of External Notes  Yes I have reviewed the patient's last endocrine visit from 1 - 9 - 25 with the patient was seen for type 1 diabetes.      Past Medical History   Medical History and Problem List  Past Medical History:   Diagnosis Date    DM I (diabetes mellitus, type I), uncontrolled (H)     Epilepsy (H)        Medications  No current outpatient medications on file.      Surgical History   No past surgical history on file.      Physical Exam   Patient Vitals for the past 24 hrs:   BP Temp Temp src Pulse Resp SpO2   01/31/25 0141 121/81 97.9  F (36.6  C) Temporal 81 16 99 %       Physical Exam  Vitals: reviewed by me  General: Pt seen  on hospital [facility], pleasant, cooperative, and alert to conversation  Eyes: Tracking well, clear conjunctiva BL  ENT: MMM, midline trachea.   Lungs: No tachypnea, no accessory muscle use. No respiratory distress.   CV: Rate as above  MSK: no joint effusion.  No evidence of trauma  Skin: No rash  Neuro: Clear speech and no facial droop.  Psych: Not RIS, no e/o AH/VH      Diagnostics   Lab Results   Labs Ordered and Resulted from Time of ED Arrival to Time of ED Departure   GLUCOSE BY METER - Abnormal       Result Value    GLUCOSE BY METER POCT 284 (*)    COMPREHENSIVE METABOLIC PANEL - Abnormal    Sodium 140      Potassium 4.9      Carbon Dioxide (CO2) 29      Anion Gap 9      Urea Nitrogen 10.8      Creatinine 0.77      GFR Estimate >90      Calcium 9.2      Chloride 102      Glucose 101 (*)     Alkaline Phosphatase 86      AST 45      ALT 26      Protein Total 7.5      Albumin 4.4      Bilirubin Total 0.3     GLUCOSE BY METER - Abnormal    GLUCOSE BY METER POCT 125 (*)    GLUCOSE BY METER - Abnormal    GLUCOSE BY METER POCT 177 (*)    GLUCOSE BY METER - Abnormal    GLUCOSE BY METER POCT 273 (*)    GLUCOSE BY METER - Normal    GLUCOSE BY METER POCT 79     CBC WITH PLATELETS AND DIFFERENTIAL    WBC Count 9.0      RBC Count 5.00      Hemoglobin 14.8      Hematocrit 43.4      MCV 87      MCH 29.6      MCHC 34.1      RDW 12.1      Platelet Count 269      % Neutrophils 70      % Lymphocytes 19      % Monocytes 7      % Eosinophils 3      % Basophils 0      % Immature Granulocytes 0      NRBCs per 100 WBC 0      Absolute Neutrophils 6.3      Absolute Lymphocytes 1.7      Absolute Monocytes 0.6      Absolute Eosinophils 0.3      Absolute Basophils 0.0      Absolute Immature Granulocytes 0.0      Absolute NRBCs 0.0             ED Course      Medications Administered   Medications - No data to display           Medical Decision Making / Diagnosis       MDM  This is a very pleasant 29-year-old male who presents to the  emergency room with what appears to be hypoglycemia.  It does sound like he was managed appropriately at home by his wife and with the glucagon he actually did rebound significantly to a hyperglycemic state but with no complications there, and he also gave 5 units of insulin shortly prior to arrival and for the last several hours has been with a stable blood sugar here.  He is very clearly knowledgeable and knows what he is doing with his pump and sugars, however family does raise the concern and it does seem quite reasonable to me, that this pump has been slightly over estimating how much insulin to give since he never really had issues with hypoglycemia before and did have a seizure earlier this summer.  I have recommended, in light of this, that the patient only give about 50% or two thirds of the recommended dosing for the immediate short-term until he can get in touch with his endocrinologist her regular doctor to eliminate the possibility of any additional hypoglycemic episodes.  This was well-received by the patient and his wife, and I do think they are stable for discharge at this time.  Highly reliable appearing patient I do think to come back to the ER immediately if anything gets worse.    ICD-10 Codes:    ICD-10-CM    1. Hypoglycemia  E16.2       2. Type 1 diabetes mellitus with hypoglycemia and without coma (H)  E10.649                         Edu Ayala MD  01/31/25 2333

## 2025-02-22 ENCOUNTER — HEALTH MAINTENANCE LETTER (OUTPATIENT)
Age: 30
End: 2025-02-22

## 2025-05-24 ENCOUNTER — HEALTH MAINTENANCE LETTER (OUTPATIENT)
Age: 30
End: 2025-05-24

## 2025-08-16 ENCOUNTER — HEALTH MAINTENANCE LETTER (OUTPATIENT)
Age: 30
End: 2025-08-16